# Patient Record
Sex: MALE | Race: WHITE | NOT HISPANIC OR LATINO | URBAN - METROPOLITAN AREA
[De-identification: names, ages, dates, MRNs, and addresses within clinical notes are randomized per-mention and may not be internally consistent; named-entity substitution may affect disease eponyms.]

---

## 2018-01-01 ENCOUNTER — EMERGENCY (EMERGENCY)
Facility: HOSPITAL | Age: 73
LOS: 1 days | End: 2018-01-01
Attending: EMERGENCY MEDICINE
Payer: MEDICARE

## 2018-01-01 VITALS — HEART RATE: 32 BPM | OXYGEN SATURATION: 88 %

## 2018-01-01 PROCEDURE — 99291 CRITICAL CARE FIRST HOUR: CPT

## 2018-01-01 PROCEDURE — 82962 GLUCOSE BLOOD TEST: CPT

## 2018-05-21 NOTE — ED PROVIDER NOTE - PROGRESS NOTE DETAILS
Time of death was 09:28 with family at bedside Spoke with Dr. Jones, who is a partner of patient's PMD Dr. Smalls. he will inform PMD.

## 2018-05-21 NOTE — ED PROVIDER NOTE - OBJECTIVE STATEMENT
73 y/o M pt with PMHx of Gout and Renal Calculi and no significant PSHx BIB EMS with wife to ED with cardiac arrest today. Per EMS, pt was c/o to his wife of CP this morning; pt went to go lay down, and was later found unconscious by his wife, prompting 911 to be called. EMS reports pt was in cardiac arrest on arrival; pt was noted to be in PEA with unknown downtime however pt was still warm on EMS arrival to the field. EMS notes pt with FS of 109 on the field. EMS states pt was given x6 epi, given 300mg Amiodarone, and was defibrillated x4, and was ultimately brought to [facility] with no ROSC; compressions were ongoing x40 minutes prior to pt arrival to ED. Extended Hx and HPI limited due to pt's condition (cardiac arrest; urgent need for intervention). EMS denies having given pt Atropine. In ED, pt's wife states pt c/o R-sided chest pain at 07:30am today; pt went to lay down, and was found with shortness of breath and diaphoresis several minutes later by his wife, who then called 911 and began chest compressions once pt became unresponsive. NKDA.

## 2018-05-21 NOTE — ED ADULT NURSE NOTE - OBJECTIVE STATEMENT
pt bought in as notification pt on active CPR pt was intubated on the field pt has left et tube size 7.5 to left line 23 followed the acls protocol

## 2018-08-31 NOTE — ED ADULT NURSE NOTE - BP NONINVASIVE DIASTOLIC (MM HG)
Nicolle Fajardo is calling because they received the first order form for cologuard and wanted to speak to the Doctor of [place] regarding a status update, cologuard information and answer any questions the doctor may have.
This is not a working number for [employer].
Tried calling back with new Alda beard gave [phone] however the person that was handling the call hung up on me.
0

## 2022-11-28 NOTE — ED PROVIDER NOTE - DIAGNOSIS COUNSELING, MDM
I had a detailed discussion with the patient and/or guardian regarding the historical points, exam findings, and any diagnostic results supporting the discharge/admit diagnosis. Received call from Maggie, pt's granddghtr, she states she spoke with Tory Davila with insurance and more info needed but she is not sure where info needed to be sent. Maggie requested I call Tory Davila at [phone], I called and received vm. I left my contact info, pt identifiers and requested call back asap. conducted a detailed discussion...